# Patient Record
Sex: MALE | Race: WHITE | NOT HISPANIC OR LATINO | URBAN - METROPOLITAN AREA
[De-identification: names, ages, dates, MRNs, and addresses within clinical notes are randomized per-mention and may not be internally consistent; named-entity substitution may affect disease eponyms.]

---

## 2018-11-02 ENCOUNTER — OUTPATIENT (OUTPATIENT)
Dept: OUTPATIENT SERVICES | Facility: HOSPITAL | Age: 73
LOS: 1 days | End: 2018-11-02

## 2018-11-02 VITALS
OXYGEN SATURATION: 97 % | TEMPERATURE: 98 F | SYSTOLIC BLOOD PRESSURE: 140 MMHG | DIASTOLIC BLOOD PRESSURE: 80 MMHG | WEIGHT: 173.94 LBS | HEIGHT: 67.5 IN | HEART RATE: 54 BPM | RESPIRATION RATE: 14 BRPM

## 2018-11-02 DIAGNOSIS — Z96.653 PRESENCE OF ARTIFICIAL KNEE JOINT, BILATERAL: Chronic | ICD-10-CM

## 2018-11-02 DIAGNOSIS — Z98.890 OTHER SPECIFIED POSTPROCEDURAL STATES: Chronic | ICD-10-CM

## 2018-11-02 DIAGNOSIS — D21.11 BENIGN NEOPLASM OF CONNECTIVE AND OTHER SOFT TISSUE OF RIGHT UPPER LIMB, INCLUDING SHOULDER: ICD-10-CM

## 2018-11-02 LAB
BUN SERPL-MCNC: 19 MG/DL — SIGNIFICANT CHANGE UP (ref 7–23)
CALCIUM SERPL-MCNC: 9.6 MG/DL — SIGNIFICANT CHANGE UP (ref 8.4–10.5)
CHLORIDE SERPL-SCNC: 104 MMOL/L — SIGNIFICANT CHANGE UP (ref 98–107)
CO2 SERPL-SCNC: 26 MMOL/L — SIGNIFICANT CHANGE UP (ref 22–31)
CREAT SERPL-MCNC: 0.97 MG/DL — SIGNIFICANT CHANGE UP (ref 0.5–1.3)
GLUCOSE SERPL-MCNC: 76 MG/DL — SIGNIFICANT CHANGE UP (ref 70–99)
HCT VFR BLD CALC: 43.7 % — SIGNIFICANT CHANGE UP (ref 39–50)
HGB BLD-MCNC: 14.5 G/DL — SIGNIFICANT CHANGE UP (ref 13–17)
MCHC RBC-ENTMCNC: 32.8 PG — SIGNIFICANT CHANGE UP (ref 27–34)
MCHC RBC-ENTMCNC: 33.2 % — SIGNIFICANT CHANGE UP (ref 32–36)
MCV RBC AUTO: 98.9 FL — SIGNIFICANT CHANGE UP (ref 80–100)
NRBC # FLD: 0 — SIGNIFICANT CHANGE UP
PLATELET # BLD AUTO: 139 K/UL — LOW (ref 150–400)
PMV BLD: 11.3 FL — SIGNIFICANT CHANGE UP (ref 7–13)
POTASSIUM SERPL-MCNC: 4.3 MMOL/L — SIGNIFICANT CHANGE UP (ref 3.5–5.3)
POTASSIUM SERPL-SCNC: 4.3 MMOL/L — SIGNIFICANT CHANGE UP (ref 3.5–5.3)
RBC # BLD: 4.42 M/UL — SIGNIFICANT CHANGE UP (ref 4.2–5.8)
RBC # FLD: 12.4 % — SIGNIFICANT CHANGE UP (ref 10.3–14.5)
SODIUM SERPL-SCNC: 142 MMOL/L — SIGNIFICANT CHANGE UP (ref 135–145)
WBC # BLD: 4.23 K/UL — SIGNIFICANT CHANGE UP (ref 3.8–10.5)
WBC # FLD AUTO: 4.23 K/UL — SIGNIFICANT CHANGE UP (ref 3.8–10.5)

## 2018-11-02 NOTE — H&P PST ADULT - PROBLEM SELECTOR PLAN 1
Pt scheduled for Excision of Mass on Right Thumb on 11/8/18  Preop instructions provided. Pt verbalized understanding.   Pepcid for GI prophylaxis provided   s/p med eval with PCP as per surgeon request  copy of EKG requested

## 2018-11-02 NOTE — H&P PST ADULT - BREAST SYMPTOMS
hx of cellulitis, left lower extremity in April 2018, s/p hospitalization x 5 days @ [facility] [place] [facility], resolved

## 2018-11-02 NOTE — H&P PST ADULT - NEGATIVE ENMT SYMPTOMS
no hearing difficulty/no sinus symptoms/no nasal discharge/no dysphagia/no nasal congestion/no throat pain

## 2018-11-02 NOTE — H&P PST ADULT - PMH
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

## 2018-11-02 NOTE — H&P PST ADULT - NEGATIVE GENERAL GENITOURINARY SYMPTOMS
no flank pain L/no flank pain R/no renal colic/no dysuria/normal urinary frequency/no bladder infections

## 2018-11-02 NOTE — H&P PST ADULT - ASSESSMENT
73 y.o. male with preop diagnosis of benign neoplasm of connective and other soft tissue of right upper limb, including shoulder

## 2018-11-02 NOTE — H&P PST ADULT - NSANTHOSAYNRD_GEN_A_CORE
No. CHANDA screening performed.  STOP BANG Legend: 0-2 = LOW Risk; 3-4 = INTERMEDIATE Risk; 5-8 = HIGH Risk

## 2018-11-02 NOTE — H&P PST ADULT - HISTORY OF PRESENT ILLNESS
73 y.o .male with hx of right thumb mass, noted 3 weeks ago, c/o pain on palpation, denies other symptoms, diagnosed with benign neoplasm of connective tissue, presents to PST for evaluation for Excision of Mass on Right Thumb on 11/8/18 73 y.o .male with hx of right thumb mass, noted 3 weeks ago, tender to touch, denies other symptoms, diagnosed with benign neoplasm of connective tissue, presents to PST for evaluation for Excision of Mass on Right Thumb on 11/8/18

## 2018-11-08 ENCOUNTER — OUTPATIENT (OUTPATIENT)
Dept: OUTPATIENT SERVICES | Facility: HOSPITAL | Age: 73
LOS: 1 days | Discharge: HOME | End: 2018-11-08
Payer: MEDICARE

## 2018-11-08 VITALS
OXYGEN SATURATION: 98 % | SYSTOLIC BLOOD PRESSURE: 163 MMHG | HEART RATE: 55 BPM | TEMPERATURE: 98 F | RESPIRATION RATE: 18 BRPM | HEIGHT: 67.5 IN | DIASTOLIC BLOOD PRESSURE: 68 MMHG | WEIGHT: 173.94 LBS

## 2018-11-08 VITALS — HEART RATE: 59 BPM

## 2018-11-08 DIAGNOSIS — Z98.890 OTHER SPECIFIED POSTPROCEDURAL STATES: Chronic | ICD-10-CM

## 2018-11-08 DIAGNOSIS — Z96.653 PRESENCE OF ARTIFICIAL KNEE JOINT, BILATERAL: Chronic | ICD-10-CM

## 2018-11-08 DIAGNOSIS — D21.11 BENIGN NEOPLASM OF CONNECTIVE AND OTHER SOFT TISSUE OF RIGHT UPPER LIMB, INCLUDING SHOULDER: ICD-10-CM

## 2018-11-08 PROCEDURE — 88305 TISSUE EXAM BY PATHOLOGIST: CPT | Mod: 26

## 2018-11-08 RX ORDER — LOTEPREDNOL ETABONATE 2 MG/ML
0 SUSPENSION/ DROPS OPHTHALMIC
Qty: 0 | Refills: 0 | COMMUNITY

## 2018-11-08 RX ORDER — TIMOLOL 0.5 %
0 DROPS OPHTHALMIC (EYE)
Qty: 0 | Refills: 0 | COMMUNITY

## 2018-11-08 RX ORDER — ACETAMINOPHEN 500 MG
1 TABLET ORAL
Qty: 12 | Refills: 0 | OUTPATIENT
Start: 2018-11-08 | End: 2018-11-10

## 2018-11-08 NOTE — ASU DISCHARGE PLAN (ADULT/PEDIATRIC). - MEDICATION SUMMARY - MEDICATIONS TO TAKE
I will START or STAY ON the medications listed below when I get home from the hospital:    acetaminophen 325 mg oral tablet  -- 1 tab(s) by mouth every 6 hours as needed for pain  -- This product contains acetaminophen.  Do not use  with any other product containing acetaminophen to prevent possible liver damage.    -- Indication: For Pain

## 2018-11-08 NOTE — ASU DISCHARGE PLAN (ADULT/PEDIATRIC). - NOTIFY
Pain not relieved by Medications/Bleeding that does not stop/Persistent Nausea and Vomiting/Fever greater than 101/Numbness, color, or temperature change to extremity/Inability to Tolerate Liquids or Foods/Swelling that continues/Unable to Urinate

## 2018-11-08 NOTE — ASU DISCHARGE PLAN (ADULT/PEDIATRIC). - INSTRUCTIONS
Regular diet Regular diet... Keep first meal light. Nothing fried, spicy or greasy. Increase fluids.

## 2018-11-08 NOTE — ASU DISCHARGE PLAN (ADULT/PEDIATRIC). - DRESSING FT
Please place band aid over incision site once dressing is removed in 24hrs, you may shower as normal

## 2018-11-08 NOTE — ASU DISCHARGE PLAN (ADULT/PEDIATRIC). - ITEMS TO FOLLOWUP WITH YOUR PHYSICIAN'S
Please follow up with Dr. Lord within x1 week after discharge from the hospital. You may call [phone] to schedule an appointment.

## 2024-12-12 NOTE — H&P PST ADULT - NSANTHSNORERD_ENT_A_CORE
[facility]  INTERVENTIONAL CARDIOLOGY  RETURN OFFICE VISIT    CHIEF COMPLAINT: Coronary artery disease, hypertension, dyslipidemia    RELEVANT CARDIAC HISTORY:  Patient with underlying history of diabetes, hypertension and dyslipidemia.  He is s/p CABG on 10/12/2010 with LIMA to LAD, SVG to PDA.  He had cath done on 4/27/2011 with stenting of 99% SVG to RCA using Xience, 3.0 x 12 mm and stenting of 80 to 90% proximal SVG to RPDA using Taxus 2.75 x 32 mm.  Nuclear stress test, 3/21/2019 showed normal perfusion with LVEF 74%.  Nuclear stress test on 6/7/2024 showed normal perfusion with normal LVEF.  He has chronic statin intolerance.  He has predominant hypertriglyceridemia.  He is on fenofibrate.   Patient is non-smoker.    HISTORY OF PRESENT ILLNESS:  Ravi Kang is a 64 year old male who presents for evaluation of Coronary artery disease, hypertension, dyslipidemia.  Patient is stable from cardiac standpoint of view since last seen.  However, he has had issues with lung infection and COVID for which she had to be given steroid courses on few occasions with which blood sugar got uncontrolled.  He is now back to his normal baseline self.  He is able to carry out all daily activities without limitation. He can walk at least two blocks and climb one flight of stairs without stopping to rest.  He specifically denies any chest pain, pressure or heaviness suggestive of angina.  There is no dyspnea, orthopnea, PND or significant lower extremity edema.  He also denies palpitations, dizziness, presyncope or loss of consciousness.  He does not describe any lower extremity discomfort suspicious for intermittent claudication.  He denies any neurologic symptoms suggestive of stroke or TIA.      MEDICATIONS:  Current Outpatient Medications   Medication Sig Dispense Refill    amLODIPine (NORVASC) 10 MG tablet Take 1 tablet by mouth daily. 90 tablet 3    hydroCHLOROthiazide 25 MG tablet Take 1 tablet by mouth daily.  90 tablet 3    valsartan (DIOVAN) 320 MG tablet Take 1 tablet by mouth daily. 90 tablet 3    Icosapent Ethyl 1 g Cap Take 2 capsules by mouth in the morning and 2 capsules in the evening. Take with meals. 360 capsule 3    tamsulosin (FLOMAX) 0.4 MG Cap Take 1 capsule by mouth once daily 90 capsule 0    pseudoephedrine (SUDAFED) 30 MG tablet Take 1 tablet by mouth every 4 hours as needed for Congestion. 24 tablet 0    semaglutide,0.25 or 0.5 mg/DOSE, (Ozempic, 0.25 or 0.5 MG/DOSE,) (0.68 mg/mL) injection Indications: Type 2 Diabetes INJECT 0.5 MG INTO THE SKIN ONCE WEEKLY 9 mL 1    fenofibrate 160 MG tablet Take 1 tablet by mouth daily. 90 tablet 3    pantoprazole (PROTONIX) 40 MG tablet Take 1 tablet by mouth once daily 90 tablet 3    Bempedoic Acid-Ezetimibe (Nexlizet) 180-10 MG Tab Take 1 tablet by mouth daily. 90 tablet 3    glimepiride (AMARYL) 2 MG tablet Take 2 tablets by mouth in the morning and 2 tablets in the evening. Take before meals. 360 tablet 3    empagliflozin (Jardiance) 10 MG tablet Take 1 tablet by mouth daily (before breakfast). 90 tablet 3    Vitamin D, Ergocalciferol, 1.25 mg (50,000 units) capsule Take 1 capsule by mouth 1 day a week. 12 capsule 3    finasteride (PROSCAR) 5 MG tablet Take 1 tablet by mouth daily. 90 tablet 1    guaiFENesin-codeine (Cheratussin AC) 100-10 MG/5ML liquid 2 tsp q HS prn 100 mL 0    fluticasone (Flonase) 50 MCG/ACT nasal spray Spray 2 sprays in each nostril daily. 16 g 1    meclizine (ANTIVERT) 25 MG tablet Take 1 tablet by mouth 3 times daily as needed for Dizziness (vertigo). 30 tablet 0    Multiple Vitamins-Minerals (MULTIVITAMIN WITH MINERALS) tablet Take 1 tablet by mouth daily.      diphenhydrAMINE (BENADRYL) 25 MG capsule Take 25 mg by mouth every 6 hours as needed for Itching.      loratadine (CLARITIN) 10 MG tablet 1 TABLET DAILY - Oral      aspirin 81 MG tablet Take 81 mg by mouth daily.   - Oral       No current facility-administered medications for this  visit.        PAST MEDICAL HISTORY:  Past Medical History:   Diagnosis Date    Chronic coronary artery disease 04/27/2011    Overview:  , Dr. Armijo, angioplasty and stenting of a 99% SVG to RCA using a \"Xience\" 3.0 x 12mm drug-eluting stent and diffuse 80-90% proximal SVG to right posterior descending artery artery stenosis using al \"Taxus\" 2.75 x 32mm drug-eluting stent    Chronic prostatitis 06/19/2013    Coronary artery disease     Depression 07/28/2017    Diabetic nephropathy associated with type 2 diabetes mellitus  (CMD) 10/11/2022    Essential (primary) hypertension     Hyperlipidemia associated with type 2 diabetes mellitus  (CMD) 09/06/2011    Regular astigmatism of both eyes 09/13/2018    Right carotid bruit 08/12/2019    Statin intolerance 11/02/2020    Type 2 diabetes mellitus without ophthalmic manifestations (CMD) 09/13/2018     Patient Active Problem List   Diagnosis    Coronary artery disease involving native coronary artery of native heart without angina pectoris    Chronic prostatitis    Depression    Hypertension associated with diabetes  (CMD)    Dyslipidemia associated with type 2 diabetes mellitus  (CMD)    Regular astigmatism of both eyes    Myopia of both eyes    Presbyopia    Type 2 diabetes mellitus without ophthalmic manifestations (CMD)    Palpitations    Right carotid bruit    Statin intolerance    Vertigo    Benign prostatic hyperplasia with nocturia    Diabetic nephropathy associated with type 2 diabetes mellitus  (CMD)    Pain in both upper extremities    Poor posture    Chest wall pain    Rotator cuff impingement syndrome of right shoulder       SURGICAL HISTORY:  Past Surgical History:   Procedure Laterality Date    Cabg, arterial, two      Cholecystectomy      Hernia repair      Tonsillectomy         FAMILY HISTORY:  Family History   Problem Relation Age of Onset    Heart disease Mother     Diabetes Mother     Patient is unaware of any medical problems Father        SOCIAL  HISTORY:  Social History     Tobacco Use    Smoking status: Never    Smokeless tobacco: Never   Vaping Use    Vaping status: never used   Substance Use Topics    Alcohol use: Yes    Drug use: Never       ALLERGY  ALLERGIES:   Allergen Reactions    Cefixime RASH    Iodine   (Environmental Or Med) HIVES    Metformin RASH     hypotensioon      Clarithromycin Nausea & Vomiting    Amlodipine Besy-Benazepril Hcl Other (See Comments)     palpitations    Atorvastatin Muscle Spasm    Lotensin Palpitations and Tremors    Rosuvastatin Other (See Comments)     Statin intolerant    Suprax RASH     Immunization History   Administered Date(s) Administered    COVID Moderna 0.5 mL 12Y+ 01/31/2021, 03/01/2021    COVID Moderna Booster 0.25 mL 12Y+ 12/04/2021    Influenza, split virus, quadrivalent, PF 10/15/2017, 10/13/2021, 10/10/2022    Influenza, split virus, trivalent, PF 02/13/2017    Influenza, unspecified formulation 11/12/2008, 09/18/2009, 10/20/2011, 10/30/2014, 10/15/2017, 11/24/2018    Tdap 07/28/2017    Zoster recombinant 10/29/2022, 01/17/2023       PAIN ASSESSMENT (Patient reports Pain):  The patient has a documented plan of care to address pain.    DEPRESSION SCREENING:  Over the past 2 weeks, has patient felt down, depressed or hopeless?No   Over the past 2 weeks, has patient felt little interest or pleasure in doing things? normal    REVIEW OF SYSTEMS:  Review of Systems   All other systems reviewed and are negative.      PHYSICAL EXAM:  Visit Vitals  /78   Pulse 82   Resp 18   Ht 6' 2\" (1.88 m)   Wt 105.9 kg (233 lb 7.5 oz)   SpO2 96%   BMI 29.98 kg/m²     Physical Exam  Constitutional:       General: He is not in acute distress.     Appearance: Normal appearance.   HENT:      Head: Normocephalic and atraumatic.      Nose: No rhinorrhea.      Mouth/Throat:      Mouth: Mucous membranes are moist.      Neck: Normal range of motion and neck supple. No tenderness.   Eyes:      General:         Right eye: No  discharge.         Left eye: No discharge.      Extraocular Movements: Extraocular movements intact.      Conjunctiva/sclera: Conjunctivae normal.   Neck:      Vascular: No carotid bruit or JVD.   Cardiovascular:      Rate and Rhythm: Normal rate and regular rhythm.      Pulses: Normal pulses.      Heart sounds: Normal heart sounds. No murmur heard.     No S3 or S4 sounds.   Pulmonary:      Effort: Pulmonary effort is normal. No respiratory distress.      Breath sounds: Normal breath sounds. No wheezing or rales.   Abdominal:      General: There is no distension.      Palpations: Abdomen is soft.      Tenderness: There is no abdominal tenderness.   Musculoskeletal:         General: Normal range of motion.      Right lower leg: No edema.      Left lower leg: No edema.   Skin:     General: Skin is warm and dry.      Findings: No erythema or rash.   Neurological:      General: No focal deficit present.      Mental Status: He is alert and oriented to person, place, and time.   Psychiatric:         Mood and Affect: Mood normal.         Behavior: Behavior normal.         Thought Content: Thought content normal.         Judgment: Judgment normal.          DATA:  I extensively reviewed clinical history, medical records, and various cardiovascular studies with patient. Pertinent testing is listed below.    Labs:  Lab Results   Component Value Date    CO2 29 12/12/2024    CO2 28 06/26/2024    CO2 28 03/13/2024    BUN 25 (H) 12/12/2024    BUN 19 06/26/2024    BUN 18 03/13/2024    CREATININE 1.30 (H) 12/12/2024    CREATININE 1.27 (H) 06/26/2024    CREATININE 1.30 (H) 03/13/2024    GLUCOSE 169 (H) 12/12/2024    GLUCOSE 184 (H) 06/26/2024    GLUCOSE 154 (H) 03/13/2024    CALCIUM 10.1 12/12/2024    CALCIUM 9.9 06/26/2024    CALCIUM 10.4 (H) 03/13/2024     No results found for: \"CKTOTAL\", \"CKMB\", \"TROPONINI\"  Lab Results   Component Value Date    WBC 5.7 12/12/2024    WBC 6.1 05/10/2023    WBC 5.9 01/11/2023    HGB 17.3 (H)  12/12/2024    HGB 17.1 (H) 05/10/2023    HGB 16.0 01/11/2023    HCT 49.9 12/12/2024    HCT 48.5 05/10/2023    HCT 44.5 01/11/2023    MCV 86.5 12/12/2024    MCV 84.3 05/10/2023    MCV 84.6 01/11/2023    MCV 88.0 02/13/2017    MCV 89.1 07/24/2016     12/12/2024     05/10/2023     01/11/2023     Lab Results   Component Value Date    HDL 37 (L) 12/12/2024    HDL 38 (L) 06/26/2024    HDL 37 (L) 03/13/2024       Diagnostic studies/procedures reviewed:    ECG:   I have independently interpreted and reviewed personally. See scanned tracing for complete interpretation.  Summary: Normal sinus rhythm.    Stress test report: 6/7/2024  I have independently interpreted and reviewed personally. See the full report for details.  IMPRESSION:   1. Normal myocardial perfusion examination.   2. The overall quality of the study is good.  3. Normal perfusion imaging without evidence of inducible ischemia or infarct.  4. Normal left ventricular volume with normal systolic thickening and function and an ejection fraction of 56%.  5. No previous study was available for comparison    Stress test report: 3/21/2019  I have independently interpreted and reviewed personally. See the full report for details.  IMPRESSION:    1.  Borderline hypertensive and normal ECG response to exercise.    2.  Normal myocardial perfusion scan without reversible ischemia or infarction.    3.  The overall left ventricle ejection fraction is 74%.         ASSESSMENT AND PLAN:    Problem List Items Addressed This Visit          Cardiac and Vasculature    Coronary artery disease involving native coronary artery of native heart without angina pectoris - Primary     CABG on 10/12/2010 with LIMA to LAD, SVG to PDA.  stenting of 99% SVG to RCA using Xience, 3.0 x 12 mm and stenting of 80 to 90% proximal SVG to RPDA using Taxus 2.75 x 32 mm on 4/27/2011.  Stress test on 3/21/2019 showed no perfusion defects with normal LVEF.  Nuclear stress test on  6/7/2024 showed normal perfusion with normal EF, 56%.  The patient is active, pain free and clinically stable.  - Continue aspirin  - continue medical therapy and risk factor modification.  - dietary and exercise counseling provided         Relevant Medications    amLODIPine (NORVASC) 10 MG tablet    hydroCHLOROthiazide 25 MG tablet    valsartan (DIOVAN) 320 MG tablet    Icosapent Ethyl 1 g Cap    Hypertension associated with diabetes  (CMD)     The patient's blood pressure is at goal.  - continue current antihypertensive medications.  - Diet and life style modifications explained.         Relevant Medications    amLODIPine (NORVASC) 10 MG tablet    hydroCHLOROthiazide 25 MG tablet    valsartan (DIOVAN) 320 MG tablet    Icosapent Ethyl 1 g Cap    Dyslipidemia associated with type 2 diabetes mellitus  (CMD)     Patient has had significant statin intolerance in the past.  He has been mixed hyperlipidemia with predominant hypertriglyceridemia.  He is currently on Nexlizet and fenofibrate 160 mg daily.  Recent triglyceride level showed improvement from 400-356 but still significantly high.  Discussed in great length about getting triglycerides under control.  Discussed option of initiation of Vascepa.  He is in understanding.  Prescription sent.  Also discussed in great length diet and lifestyle modifications.  He verbalized understanding and is in agreement.  Will recheck lipid panel in next 3 to 6 months.         Relevant Orders    Lipid Panel With Reflex       Preventive Cardiology:   - Diet: instructed pt to follow a low salt, low fat   - Does patient smoke: No Smoking cessation education was not provided.  - Ravi's BMI is Body mass index is 29.98 kg/m²., which is outside of normal parameters.  Patient counseled on nutrition, exercise and healthy lifestyle.  - Does patient exercise? Yes Was counseling given: Yes  - Use of Aspirin for Primary Prevention: Patient is already on aspirin, risks and benefits  discussed.      Mr. Kang will Return in about 6 months (around 6/12/2025), or if symptoms worsen or fail to improve.    Thank you for involving us in the care of this pleasant patient. Please do not hesitate to contact us if there are any questions or concerns regarding cardiac care of this patient.    Total time spent at least 45 minutes. This includes: detailed review of chart, obtaining history, examining patient, reviewing previous test results with patient, discussing medications and referrals, and coordination of care.     [A medical voice dictation device/ Dragon technology was utilized for this medical encounter. Transcription/ unintentional errors (in word selection, spelling or grammar) are not uncommon and may not have been corrected prior to/ after electronically signing the note. Should you find these errors, please consult the clinician (or me) for interpretation (or apply common sense adjustment when safe and appropriate).]    Note to patient: The 21st Century Cures Act makes medical notes like these available to patients in the interest of transparency. However, this is a medical document intended as peer to peer communication. It is written in medical language and may contain abbreviations or verbiage that are unfamiliar.  It may appear blunt or direct. Medical documents are intended to carry relevant information, facts as evident, and the clinical impression/opinion of the practitioner     Ramesh Chillal Kashinath, MD, FACC, [facility]  Interventional Cardiology   No